# Patient Record
Sex: MALE
[De-identification: names, ages, dates, MRNs, and addresses within clinical notes are randomized per-mention and may not be internally consistent; named-entity substitution may affect disease eponyms.]

---

## 2020-07-01 PROBLEM — Z00.00 ENCOUNTER FOR PREVENTIVE HEALTH EXAMINATION: Status: ACTIVE | Noted: 2020-07-01

## 2020-07-10 ENCOUNTER — APPOINTMENT (OUTPATIENT)
Dept: COLORECTAL SURGERY | Facility: CLINIC | Age: 70
End: 2020-07-10
Payer: MEDICARE

## 2020-07-10 VITALS
TEMPERATURE: 98.7 F | HEIGHT: 68 IN | DIASTOLIC BLOOD PRESSURE: 66 MMHG | HEART RATE: 61 BPM | WEIGHT: 202 LBS | SYSTOLIC BLOOD PRESSURE: 139 MMHG | BODY MASS INDEX: 30.62 KG/M2

## 2020-07-10 DIAGNOSIS — I10 ESSENTIAL (PRIMARY) HYPERTENSION: ICD-10-CM

## 2020-07-10 DIAGNOSIS — Z72.3 LACK OF PHYSICAL EXERCISE: ICD-10-CM

## 2020-07-10 DIAGNOSIS — E78.00 PURE HYPERCHOLESTEROLEMIA, UNSPECIFIED: ICD-10-CM

## 2020-07-10 DIAGNOSIS — K62.3 RECTAL PROLAPSE: ICD-10-CM

## 2020-07-10 DIAGNOSIS — Z72.89 OTHER PROBLEMS RELATED TO LIFESTYLE: ICD-10-CM

## 2020-07-10 DIAGNOSIS — Z80.0 FAMILY HISTORY OF MALIGNANT NEOPLASM OF DIGESTIVE ORGANS: ICD-10-CM

## 2020-07-10 PROCEDURE — 99203 OFFICE O/P NEW LOW 30 MIN: CPT | Mod: 25

## 2020-07-10 PROCEDURE — 45300 PROCTOSIGMOIDOSCOPY DX: CPT

## 2020-07-10 RX ORDER — OLMESARTAN MEDOXOMIL-HYDROCHLOROTHIAZIDE 25; 40 MG/1; MG/1
TABLET, FILM COATED ORAL
Refills: 0 | Status: ACTIVE | COMMUNITY

## 2020-07-10 RX ORDER — ATORVASTATIN CALCIUM 10 MG/1
10 TABLET, FILM COATED ORAL
Refills: 0 | Status: ACTIVE | COMMUNITY

## 2020-07-10 RX ORDER — ASPIRIN 81 MG
81 TABLET, DELAYED RELEASE (ENTERIC COATED) ORAL
Refills: 0 | Status: ACTIVE | COMMUNITY

## 2020-07-10 RX ORDER — LABETALOL HYDROCHLORIDE 100 MG/1
100 TABLET, FILM COATED ORAL
Refills: 0 | Status: ACTIVE | COMMUNITY

## 2020-07-10 NOTE — PHYSICAL EXAM
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 [FreeTextEntry1] : A rigid proctosigmoidoscope was passed through he anus into the rectum to   cm. . The mucosal surface were inspected. The patient tolerated the procedure well.\par \par The findings revealed:\par mild internal hemorrhoids

## 2020-07-10 NOTE — ASSESSMENT
[FreeTextEntry1] : I reviewed the patient and his wife of the findings on examination are consistent with mucosal prolapse. I have suggested that given his advanced age that we proceed with conservative nonsurgical management. Avoidance of local trauma. If symptoms worsen consideration for mucosal excision may be appropriate. However I discussed with him the risks, benefits alternatives of surgical treatment.

## 2020-07-10 NOTE — HISTORY OF PRESENT ILLNESS
----- Message from Richelle Robledo sent at 2/27/2017  8:11 AM CST -----  Contact: pt mom #[phone]  Mom would like a call back in regards to pt fever.  
Fever, cold symptoms, advised on sx care, advised mom to call if symptoms get worse or for other concerns or questions  
[FreeTextEntry1] : 70 yo M presents for evaluation of possible anal fistula\par \par Completed MRI to evaluate right hip and leg pain. Patient presents w/ MRI disk. 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 Oneal at [facility] who noted ?mass vs hemorrhoids and referred to Dr. Kennedy at [facility] approximately 5 years ago. Pt reports area of biopsied and MRI completed and was otherwise normal\par \par Hx of rectal bleeding w/ some BMs for years. Blood noted on TP, last seen this morning.\par Denies hx of anal swelling, pain, or discharge\par Denies constipation or diarrhea\par BH: once daily, denies straining\par Denies fiber supplements or stool softeners\par \par Last colonoscopy 5/2015 w/ JULIA Barber at [facility], ?polyp removed and advised to repeat in 5 colonoscopy\par Denies [facility] colorectal CA or IBD. Father w/ stomach CA\par Takes ASA 81 mg